# Patient Record
Sex: FEMALE | Race: ASIAN | NOT HISPANIC OR LATINO | URBAN - METROPOLITAN AREA
[De-identification: names, ages, dates, MRNs, and addresses within clinical notes are randomized per-mention and may not be internally consistent; named-entity substitution may affect disease eponyms.]

---

## 2017-12-19 ENCOUNTER — EMERGENCY (EMERGENCY)
Facility: HOSPITAL | Age: 22
LOS: 1 days | Discharge: HOME | End: 2017-12-19
Attending: EMERGENCY MEDICINE | Admitting: EMERGENCY MEDICINE
Payer: MEDICAID

## 2017-12-19 VITALS
TEMPERATURE: 98 F | RESPIRATION RATE: 18 BRPM | SYSTOLIC BLOOD PRESSURE: 98 MMHG | DIASTOLIC BLOOD PRESSURE: 60 MMHG | OXYGEN SATURATION: 99 % | HEART RATE: 69 BPM

## 2017-12-19 PROCEDURE — 99283 EMERGENCY DEPT VISIT LOW MDM: CPT

## 2017-12-19 NOTE — ED PROVIDER NOTE - ATTENDING CONTRIBUTION TO CARE
I, Jennifer Cabot, MD, have performed a history and physical exam of the patient and discussed their management with the resident. I reviewed the resident's note and agree with the documented findings and plan of care. My medical decision making and observations are found above.    Cabot: 22F with 4 days of L knee pain after standing for long periods at work and 2 days of L great toe pain after dropping a hammer onto it.  Ambulatory.  On exam, L knee with no deformity or edema, NTTP at joint line, no laxity, FROM, L great toe with ecchymosis under the nail, NTTP, FROM, foot 2+ DP pulse, wwp, no calf tenderness.  Likely no bony injury.  Ok to go home with motrin and ortho contact info if needed.

## 2017-12-19 NOTE — ED PROVIDER NOTE - NS_ ATTENDINGSCRIBEDETAILS _ED_A_ED_FT
I, Jennifer Cabot, MD, have performed a history and physical exam of the patient and discussed their management with the resident. I reviewed the scribe's and resident's notes and agree with the documented findings and plan of care. My medical decision making and observations are found above.

## 2017-12-19 NOTE — ED PROVIDER NOTE - OBJECTIVE STATEMENT
Pt is a 23 y/o F with no significant medical history who presents to the ED with lateral L knee pain and L toe pain. She works as a , has had L lateral knee pain x4 days w/o trauma that has been gradually getting worse. She went for a massage , and afterwards started having worse pain in the L knee. Able to ambulate, difficulty with stairs, and bending that leg increases pain. She has taken Tylenol for pain, but denies taking anything today. L toe pain is x2 days s/p dropping hammer on her toe 2 days ago. She has increased pain with walking and weight bearing. LKMP was 3 weeks ago. She has NKDA, no EtOH, and no tobacco usage. Denies any swelling, or other complaints. Pt is a 21 y/o F with no significant medical history who presents to the ED with lateral L knee pain and L toe pain. She works as a , has had L lateral knee pain x5 days w/o trauma that has been gradually getting worse. She went for a massage , and afterwards started having worse pain in the L knee. Able to ambulate, difficulty with stairs, and bending that leg increases pain. She has taken Tylenol for pain, but denies taking anything today. L toe pain is x2 days s/p dropping hammer on her toe 2 days ago. She has increased pain with walking and weight bearing. LMP was 3 weeks ago. She has NKDA, no EtOH, and no tobacco usage. Denies any swelling, or other complaints.

## 2017-12-19 NOTE — ED ADULT TRIAGE NOTE - CHIEF COMPLAINT QUOTE
Patient has c/o left big toe injury during the weekend after a hammer fell on her toe. Nail has a green area. Also she was getting a massage for her right leg and she started to have right knee pain.

## 2017-12-19 NOTE — ED PROVIDER NOTE - MEDICAL DECISION MAKING DETAILS
Pt is a 23 y/o F who presents to the ED with atraumatic L knee pain likely due to standing for long periods. Normal knee exam. Toe pain with no bony tenderness and no suspicion for fracture. Recommend ice and NSAIDS for both areas of pain. Ortho follow-up if pain persists. Pt is a 23 y/o F who presents to the ED with atraumatic L knee pain likely due to standing for long periods. Normal knee exam. Toe pain with no bony tenderness and no suspicion for fracture. Recommend ice and NSAIDS for both areas of pain. Ortho follow-up if pain persists.    Cabot: 22F with 4 days of L knee pain after standing for long periods at work and 2 days of L great toe pain after dropping a hammer onto it.  Ambulatory.  On exam, L knee with no deformity or edema, NTTP at joint line, no laxity, FROM, L great toe with ecchymosis under the nail, NTTP, FROM, foot 2+ DP pulse, wwp, no calf tenderness.  Likely no bony injury.  Ok to go home with motrin and ortho contact info if needed.

## 2017-12-19 NOTE — ED PROVIDER NOTE - LOWER EXTREMITY EXAM, LEFT
L knee had no swelling, no redness, no tenderness, FROM, and no joint instability,  L first toe has no bony tenderness, some tenderness over nail and some ecchymosis under nail.
